# Patient Record
Sex: MALE | Race: WHITE | NOT HISPANIC OR LATINO | URBAN - METROPOLITAN AREA
[De-identification: names, ages, dates, MRNs, and addresses within clinical notes are randomized per-mention and may not be internally consistent; named-entity substitution may affect disease eponyms.]

---

## 2017-10-06 ENCOUNTER — OUTPATIENT (OUTPATIENT)
Dept: OUTPATIENT SERVICES | Facility: HOSPITAL | Age: 62
LOS: 1 days | Discharge: HOME | End: 2017-10-06

## 2017-10-06 ENCOUNTER — RESULT REVIEW (OUTPATIENT)
Age: 62
End: 2017-10-06

## 2017-10-10 LAB — SURGICAL PATHOLOGY STUDY: SIGNIFICANT CHANGE UP

## 2017-10-12 DIAGNOSIS — I10 ESSENTIAL (PRIMARY) HYPERTENSION: ICD-10-CM

## 2017-10-12 DIAGNOSIS — H69.82 OTHER SPECIFIED DISORDERS OF EUSTACHIAN TUBE, LEFT EAR: ICD-10-CM

## 2017-10-12 DIAGNOSIS — H93.12 TINNITUS, LEFT EAR: ICD-10-CM

## 2017-10-12 DIAGNOSIS — N40.0 BENIGN PROSTATIC HYPERPLASIA WITHOUT LOWER URINARY TRACT SYMPTOMS: ICD-10-CM

## 2017-10-12 DIAGNOSIS — J35.2 HYPERTROPHY OF ADENOIDS: ICD-10-CM

## 2017-10-12 DIAGNOSIS — H71.12 CHOLESTEATOMA OF TYMPANUM, LEFT EAR: ICD-10-CM

## 2017-10-12 DIAGNOSIS — H66.92 OTITIS MEDIA, UNSPECIFIED, LEFT EAR: ICD-10-CM

## 2018-09-17 PROBLEM — Z00.00 ENCOUNTER FOR PREVENTIVE HEALTH EXAMINATION: Status: ACTIVE | Noted: 2018-09-17

## 2018-09-24 ENCOUNTER — OUTPATIENT (OUTPATIENT)
Dept: OUTPATIENT SERVICES | Facility: HOSPITAL | Age: 63
LOS: 1 days | End: 2018-09-24
Payer: COMMERCIAL

## 2018-09-24 ENCOUNTER — APPOINTMENT (OUTPATIENT)
Dept: CT IMAGING | Facility: CLINIC | Age: 63
End: 2018-09-24
Payer: COMMERCIAL

## 2018-09-24 PROCEDURE — 70480 CT ORBIT/EAR/FOSSA W/O DYE: CPT | Mod: 26

## 2018-09-24 PROCEDURE — 70480 CT ORBIT/EAR/FOSSA W/O DYE: CPT

## 2023-03-21 ENCOUNTER — NON-APPOINTMENT (OUTPATIENT)
Age: 68
End: 2023-03-21

## 2023-03-21 ENCOUNTER — APPOINTMENT (OUTPATIENT)
Dept: SURGICAL ONCOLOGY | Facility: CLINIC | Age: 68
End: 2023-03-21
Payer: COMMERCIAL

## 2023-03-21 VITALS
WEIGHT: 185 LBS | DIASTOLIC BLOOD PRESSURE: 84 MMHG | HEART RATE: 67 BPM | RESPIRATION RATE: 18 BRPM | HEIGHT: 71 IN | BODY MASS INDEX: 25.9 KG/M2 | OXYGEN SATURATION: 99 % | SYSTOLIC BLOOD PRESSURE: 138 MMHG

## 2023-03-21 DIAGNOSIS — R10.84 GENERALIZED ABDOMINAL PAIN: ICD-10-CM

## 2023-03-21 PROCEDURE — 99244 OFF/OP CNSLTJ NEW/EST MOD 40: CPT

## 2023-03-21 NOTE — HISTORY OF PRESENT ILLNESS
[de-identified] : 67 y.o. man referred by Dr. Waldemar Langford with two episodes of generalized abdominal pain.\par The patient describes the pain as diffuse to all quadrants, constant, and was not triggered by meals.\par In occasion of the first episode, on Feb 18, 2023, patient went to [facility].\par WBC 4.2, AST//628, ALP 79, tbili 2.4, dbili 1.3.\par CT AP showed no acute abdominal pathology and no gallstones.\par Dr. Langford odered an MRCP\par 03/13/23 MRCP: possible cirrhosis. Small gallstones without gallbladder wall thickening or pericholecystic fluid. There is no discrete gallbladder polyp or mass. The bile ducts are normal in caliber without evidence for stone, mass, or stricture.\par Given those findings, Dr. 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

## 2023-03-21 NOTE — REVIEW OF SYSTEMS
[Patient Intake Form Reviewed] : Patient intake form was reviewed [Negative] : Heme/Lymph [FreeTextEntry8] : abd pain -  see HPI

## 2023-03-21 NOTE — ASSESSMENT
[FreeTextEntry1] : 67 y.o. man with two recent episodes of diffuse abdominal pain.\par Symptoms are not classic for gallstone disease.\par Labs obtained during first episodes also not c/w gallstones, as transaminases were quite elevated but WBC and ALP were normal. This is not consistent with neither acute cholecystitis nor with transient CBD obstruction. \par In fact, it is unclear whether patient has gallstones - the report of first MR mentions gallstones, the second does not.\par I will obtain the actual imaging of both MR (currently only reports available in our records) and have our radiologists review for accuracy.\par I will also obtain a RUQ US.\par If confirmed gallstones, I would consider a cholecystectomy for atypical symptoms, especially if recurrent episodes of pain. In that occasion will also obtain a liver bx to r/o cirrhosis. \par If gallstones not confirmed, we will have to consider other diagnoses lower in the list of differentials, but cholecystectomy likely not indicated.

## 2023-03-21 NOTE — REASON FOR VISIT
[Initial Consultation] : an initial consultation for [Referred By: ___] : Referred By: [unfilled] [FreeTextEntry2] : abdominal pain

## 2023-03-22 PROBLEM — R10.84 GENERALIZED ABDOMINAL PAIN: Status: ACTIVE | Noted: 2023-03-22

## 2023-03-29 ENCOUNTER — RESULT REVIEW (OUTPATIENT)
Age: 68
End: 2023-03-29